# Patient Record
Sex: FEMALE | Race: WHITE | URBAN - METROPOLITAN AREA
[De-identification: names, ages, dates, MRNs, and addresses within clinical notes are randomized per-mention and may not be internally consistent; named-entity substitution may affect disease eponyms.]

---

## 2020-09-23 ENCOUNTER — CLAIMS CREATED FROM THE CLAIM WINDOW (OUTPATIENT)
Dept: URBAN - METROPOLITAN AREA CLINIC 4 | Facility: CLINIC | Age: 70
End: 2020-09-23
Payer: MEDICARE

## 2020-09-23 ENCOUNTER — OFFICE VISIT (OUTPATIENT)
Dept: URBAN - NONMETROPOLITAN AREA SURGERY CENTER 1 | Facility: SURGERY CENTER | Age: 70
End: 2020-09-23
Payer: MEDICARE

## 2020-09-23 DIAGNOSIS — Z86.010 H/O ADENOMATOUS POLYP OF COLON: ICD-10-CM

## 2020-09-23 DIAGNOSIS — D12.4 ADENOMA OF DESCENDING COLON: ICD-10-CM

## 2020-09-23 DIAGNOSIS — D12.4 BENIGN NEOPLASM OF DESCENDING COLON: ICD-10-CM

## 2020-09-23 PROCEDURE — G9936 PMH PLYP/NEO CO/RECT/JUN/ANS: HCPCS | Performed by: INTERNAL MEDICINE

## 2020-09-23 PROCEDURE — 88305 TISSUE EXAM BY PATHOLOGIST: CPT | Performed by: PATHOLOGY

## 2020-09-23 PROCEDURE — 45385 COLONOSCOPY W/LESION REMOVAL: CPT | Performed by: INTERNAL MEDICINE

## 2020-09-23 PROCEDURE — G8907 PT DOC NO EVENTS ON DISCHARG: HCPCS | Performed by: INTERNAL MEDICINE

## 2021-02-09 ENCOUNTER — APPOINTMENT (OUTPATIENT)
Dept: URBAN - NONMETROPOLITAN AREA CLINIC 45 | Age: 71
Setting detail: DERMATOLOGY
End: 2021-02-15

## 2021-02-09 DIAGNOSIS — L64.8 OTHER ANDROGENIC ALOPECIA: ICD-10-CM

## 2021-02-09 DIAGNOSIS — L82.1 OTHER SEBORRHEIC KERATOSIS: ICD-10-CM

## 2021-02-09 DIAGNOSIS — L21.8 OTHER SEBORRHEIC DERMATITIS: ICD-10-CM

## 2021-02-09 PROBLEM — D48.5 NEOPLASM OF UNCERTAIN BEHAVIOR OF SKIN: Status: ACTIVE | Noted: 2021-02-09

## 2021-02-09 PROCEDURE — 99203 OFFICE O/P NEW LOW 30 MIN: CPT | Mod: 25

## 2021-02-09 PROCEDURE — OTHER MIPS QUALITY: OTHER

## 2021-02-09 PROCEDURE — OTHER REASSURANCE: OTHER

## 2021-02-09 PROCEDURE — OTHER PRESCRIPTION: OTHER

## 2021-02-09 PROCEDURE — OTHER TREATMENT REGIMEN: OTHER

## 2021-02-09 PROCEDURE — OTHER BIOPSY BY SHAVE METHOD: OTHER

## 2021-02-09 PROCEDURE — 11102 TANGNTL BX SKIN SINGLE LES: CPT

## 2021-02-09 PROCEDURE — OTHER COUNSELING: OTHER

## 2021-02-09 RX ORDER — FLUTICASONE PROPIONATE 0.5 MG/G
APPLY CREAM TOPICAL
Qty: 1 | Refills: 1 | Status: ERX | COMMUNITY
Start: 2021-02-09

## 2021-02-09 ASSESSMENT — LOCATION DETAILED DESCRIPTION DERM
LOCATION DETAILED: LEFT POPLITEAL SKIN
LOCATION DETAILED: POSTERIOR MID-PARIETAL SCALP
LOCATION DETAILED: RIGHT SUPERIOR MEDIAL UPPER BACK
LOCATION DETAILED: RIGHT SUPERIOR UPPER BACK

## 2021-02-09 ASSESSMENT — LOCATION ZONE DERM
LOCATION ZONE: LEG
LOCATION ZONE: TRUNK
LOCATION ZONE: SCALP

## 2021-02-09 ASSESSMENT — LOCATION SIMPLE DESCRIPTION DERM
LOCATION SIMPLE: LEFT POPLITEAL SKIN
LOCATION SIMPLE: RIGHT UPPER BACK
LOCATION SIMPLE: POSTERIOR SCALP

## 2021-02-09 NOTE — HPI: OTHER
Condition:: Hair loss
Please Describe Your Condition:: is being seen for a chief complaint of Hair loss . Patient states her hair started coming out more than a year ago. Patient states no increased stress, patient has had surgery 2 months ago (right leg tibia/fibula fracture post fall) but states this is not what caused it because she had major hair loss before the surgery.  Patient has tried OTC Biotin and revive and cell shampoos with no help. \\nPatient had recent blood work with primary care doctor for thyroid and she states her thyroid is stable and has been on levothyroxine for 25 years. \\nPatient states she cannot figure out what started her hair loss but it was sudden.  Patient saw PA at Dr. Calzada’s office and felt dismissed, was given no treatment and no labs given.  Positive family history of hair loss mother and sister.
Condition:: History of skin cancer
Please Describe Your Condition:: is being seen for a chief complaint of History of skin cancer . Patient states she had a skin cancer removed on right forehead, unsure which type but was removed by a plastic surgeon about 8-10 years ago.  Patient has seen another dermatologist for skin cancer surveillance and had a full body about 4 months ago, declines full exam today.

## 2021-02-09 NOTE — PROCEDURE: TREATMENT REGIMEN
Plan: OTC Minoxidil 5% once daily \\nOTC Biotin 5mg once a day \\nBloodwork ordered today (CBC with diff, ferritin, Iron/TIBC-[place])\\nReviewed recent bloodwork (CMP, TSH, T4)\\nConsider dutasteride if worsens or persists
Detail Level: Simple
Plan: Fluticasone cream twice a day as needed for ear scaling

## 2021-02-09 NOTE — PROCEDURE: BIOPSY BY SHAVE METHOD
Detail Level: Detailed
Hide Anticipated Plan (Based On Presumed Biopsy Results)?: No
Anesthesia Type: 1% lidocaine with epinephrine and a 1:10 solution of 8.4% sodium bicarbonate
Body Location Override (Optional - Billing Will Still Be Based On Selected Body Map Location If Applicable): right medial shoulder
Consent: Written consent was obtained and risks were reviewed including but not limited to scarring, infection, bleeding, scabbing, incomplete removal, nerve damage and allergy to anesthesia.
Notification Instructions: Patient will be notified of biopsy results. However, patient instructed to call the office if not contacted within 2 weeks.
Anesthesia Volume In Cc (Will Not Render If 0): 1
Hemostasis: Aluminum Chloride
Curettage Text: The wound bed was treated with curettage after the biopsy was performed.
Information: Selecting Yes will display possible errors in your note based on the variables you have selected. This validation is only offered as a suggestion for you. PLEASE NOTE THAT THE VALIDATION TEXT WILL BE REMOVED WHEN YOU FINALIZE YOUR NOTE. IF YOU WANT TO FAX A PRELIMINARY NOTE YOU WILL NEED TO TOGGLE THIS TO 'NO' IF YOU DO NOT WANT IT IN YOUR FAXED NOTE.
Post-Care Instructions: I reviewed with the patient in detail post-care instructions. Patient is to keep the biopsy site dry overnight, and then apply bacitracin twice daily until healed. Patient may apply hydrogen peroxide soaks to remove any crusting.
Dressing: pressure dressing with telfa
Electrodesiccation And Curettage Text: The wound bed was treated with electrodesiccation and curettage after the biopsy was performed.
Biopsy Method: double edge Personna blade
Size Of Lesion In Cm: 0
Wound Care: Mupirocin
Cryotherapy Text: The wound bed was treated with cryotherapy after the biopsy was performed.
Billing Type: Third-Party Bill
Biopsy Type: H and E
Was A Bandage Applied: Yes
Silver Nitrate Text: The wound bed was treated with silver nitrate after the biopsy was performed.
Depth Of Biopsy: dermis
Type Of Destruction Used: Curettage
Electrodesiccation Text: The wound bed was treated with electrodesiccation after the biopsy was performed.

## 2022-09-12 ENCOUNTER — CLAIMS CREATED FROM THE CLAIM WINDOW (OUTPATIENT)
Dept: URBAN - NONMETROPOLITAN AREA CLINIC 2 | Facility: CLINIC | Age: 72
End: 2022-09-12
Payer: MEDICARE

## 2022-09-12 ENCOUNTER — LAB OUTSIDE AN ENCOUNTER (OUTPATIENT)
Dept: URBAN - NONMETROPOLITAN AREA CLINIC 2 | Facility: CLINIC | Age: 72
End: 2022-09-12

## 2022-09-12 ENCOUNTER — WEB ENCOUNTER (OUTPATIENT)
Dept: URBAN - NONMETROPOLITAN AREA CLINIC 2 | Facility: CLINIC | Age: 72
End: 2022-09-12

## 2022-09-12 ENCOUNTER — TELEPHONE ENCOUNTER (OUTPATIENT)
Dept: URBAN - NONMETROPOLITAN AREA CLINIC 2 | Facility: CLINIC | Age: 72
End: 2022-09-12

## 2022-09-12 VITALS
BODY MASS INDEX: 31.66 KG/M2 | DIASTOLIC BLOOD PRESSURE: 82 MMHG | HEIGHT: 66 IN | TEMPERATURE: 97.5 F | SYSTOLIC BLOOD PRESSURE: 141 MMHG | HEART RATE: 77 BPM | WEIGHT: 197 LBS

## 2022-09-12 DIAGNOSIS — K44.9 HIATAL HERNIA: ICD-10-CM

## 2022-09-12 DIAGNOSIS — Z72.89 ALCOHOL USE: ICD-10-CM

## 2022-09-12 DIAGNOSIS — D50.8 ACQUIRED IRON DEFICIENCY ANEMIA DUE TO DECREASED ABSORPTION: ICD-10-CM

## 2022-09-12 DIAGNOSIS — Z86.010 PERSONAL HISTORY OF COLONIC POLYPS: ICD-10-CM

## 2022-09-12 DIAGNOSIS — D50.0 IRON DEFICIENCY ANEMIA DUE TO CHRONIC BLOOD LOSS: ICD-10-CM

## 2022-09-12 PROCEDURE — 99214 OFFICE O/P EST MOD 30 MIN: CPT | Performed by: NURSE PRACTITIONER

## 2022-09-12 RX ORDER — FERROUS SULFATE 142(45)MG
1 TABLET TABLET, EXTENDED RELEASE ORAL ONCE A DAY
Status: ACTIVE | COMMUNITY

## 2022-09-12 RX ORDER — CITALOPRAM 40 MG/1
0.5 TABLET TABLET, FILM COATED ORAL ONCE A DAY
Status: ACTIVE | COMMUNITY

## 2022-09-12 RX ORDER — DENOSUMAB 60 MG/ML
AS DIRECTED INJECTION SUBCUTANEOUS
Status: ACTIVE | COMMUNITY

## 2022-09-12 RX ORDER — GABAPENTIN 300 MG/1
1 CAPSULE CAPSULE ORAL ONCE A DAY
Status: ACTIVE | COMMUNITY

## 2022-09-12 RX ORDER — MIRABEGRON 50 MG/1
1 TABLET TABLET, FILM COATED, EXTENDED RELEASE ORAL ONCE A DAY
Status: ACTIVE | COMMUNITY

## 2022-09-12 RX ORDER — PRAVASTATIN SODIUM 40 MG/1
1 TABLET TABLET ORAL ONCE A DAY
Status: ACTIVE | COMMUNITY

## 2022-09-12 NOTE — HPI-TODAY'S VISIT:
On/12/2022 Edith presents for evaluation of iron deficiency anemia.  She was recently found to have a hemoglobin of 8.7 on routine lab work.  She denies any GI bleeding.  Her last colonoscopy was done by Dr. Quick with 1 benign polyp in September 2020.  She does have a history of reflux and dyspepsia.  She is on Nexium for years and is recently been taking Pepcid due to her osteopenia and osteoporosis.  She states that she was told a long time ago that she had a large hiatal hernia.  She is never had an upper endoscopy.  She has been on Slow Fe since her labs done in August.  Today we have discussed the work-up for iron deficiency anemia.  She would like to defer repeat colonoscopy for now, she agrees for an upper endoscopy to evaluate for peptic ulcer disease.  She does take Aleve 1 or 2 times a month.  She also states that she typically drinks 3 cocktails every night.  She has not had any labs done of her liver function, she has had no imaging of her liver.  Today she is doing well otherwise with no new GI complaints.  MB

## 2022-09-13 LAB
ALBUMIN/GLOBULIN RATIO: 1.6
ALBUMIN: 4.5
ALKALINE PHOSPHATASE: 76
ALT (SGPT): 16
AST (SGOT): 17
BILIRUBIN, DIRECT: 0.1
BILIRUBIN, INDIRECT: 0.3
BILIRUBIN, TOTAL: 0.4
GLOBULIN: 2.8
PROTEIN, TOTAL: 7.3

## 2022-09-30 ENCOUNTER — TELEPHONE ENCOUNTER (OUTPATIENT)
Dept: URBAN - METROPOLITAN AREA CLINIC 92 | Facility: CLINIC | Age: 72
End: 2022-09-30

## 2022-10-06 ENCOUNTER — OFFICE VISIT (OUTPATIENT)
Dept: URBAN - METROPOLITAN AREA MEDICAL CENTER 1 | Facility: MEDICAL CENTER | Age: 72
End: 2022-10-06

## 2022-10-06 RX ORDER — CITALOPRAM 40 MG/1
0.5 TABLET TABLET, FILM COATED ORAL ONCE A DAY
Status: ACTIVE | COMMUNITY

## 2022-10-06 RX ORDER — DENOSUMAB 60 MG/ML
AS DIRECTED INJECTION SUBCUTANEOUS
Status: ACTIVE | COMMUNITY

## 2022-10-06 RX ORDER — GABAPENTIN 300 MG/1
1 CAPSULE CAPSULE ORAL ONCE A DAY
Status: ACTIVE | COMMUNITY

## 2022-10-06 RX ORDER — MIRABEGRON 50 MG/1
1 TABLET TABLET, FILM COATED, EXTENDED RELEASE ORAL ONCE A DAY
Status: ACTIVE | COMMUNITY

## 2022-10-06 RX ORDER — PRAVASTATIN SODIUM 40 MG/1
1 TABLET TABLET ORAL ONCE A DAY
Status: ACTIVE | COMMUNITY

## 2022-10-06 RX ORDER — FERROUS SULFATE 142(45)MG
1 TABLET TABLET, EXTENDED RELEASE ORAL ONCE A DAY
Status: ACTIVE | COMMUNITY

## 2022-11-03 ENCOUNTER — LAB OUTSIDE AN ENCOUNTER (OUTPATIENT)
Dept: URBAN - NONMETROPOLITAN AREA CLINIC 2 | Facility: CLINIC | Age: 72
End: 2022-11-03

## 2022-11-03 ENCOUNTER — OFFICE VISIT (OUTPATIENT)
Dept: URBAN - METROPOLITAN AREA MEDICAL CENTER 1 | Facility: MEDICAL CENTER | Age: 72
End: 2022-11-03
Payer: MEDICARE

## 2022-11-03 DIAGNOSIS — D50.9 ANEMIA: ICD-10-CM

## 2022-11-03 DIAGNOSIS — K31.89 ACQUIRED DEFORMITY OF DUODENUM: ICD-10-CM

## 2022-11-03 DIAGNOSIS — K22.89 DILATATION OF ESOPHAGUS: ICD-10-CM

## 2022-11-03 PROCEDURE — 43239 EGD BIOPSY SINGLE/MULTIPLE: CPT | Performed by: INTERNAL MEDICINE

## 2022-11-03 RX ORDER — CITALOPRAM 40 MG/1
0.5 TABLET TABLET, FILM COATED ORAL ONCE A DAY
Status: ACTIVE | COMMUNITY

## 2022-11-03 RX ORDER — GABAPENTIN 300 MG/1
1 CAPSULE CAPSULE ORAL ONCE A DAY
Status: ACTIVE | COMMUNITY

## 2022-11-03 RX ORDER — PRAVASTATIN SODIUM 40 MG/1
1 TABLET TABLET ORAL ONCE A DAY
Status: ACTIVE | COMMUNITY

## 2022-11-03 RX ORDER — DENOSUMAB 60 MG/ML
AS DIRECTED INJECTION SUBCUTANEOUS
Status: ACTIVE | COMMUNITY

## 2022-11-03 RX ORDER — FERROUS SULFATE 142(45)MG
1 TABLET TABLET, EXTENDED RELEASE ORAL ONCE A DAY
Status: ACTIVE | COMMUNITY

## 2022-11-03 RX ORDER — MIRABEGRON 50 MG/1
1 TABLET TABLET, FILM COATED, EXTENDED RELEASE ORAL ONCE A DAY
Status: ACTIVE | COMMUNITY

## 2022-11-04 LAB
AP CASE REPORT: (no result)
AP FINAL DIAGNOSIS: (no result)
AP GROSS DESCRIPTION: (no result)
AP MICROSCOPIC DESCRIPTION: (no result)
AP SPECIAL STAINS: (no result)

## 2023-02-03 ENCOUNTER — WEB ENCOUNTER (OUTPATIENT)
Dept: URBAN - NONMETROPOLITAN AREA CLINIC 2 | Facility: CLINIC | Age: 73
End: 2023-02-03

## 2023-02-03 ENCOUNTER — DASHBOARD ENCOUNTERS (OUTPATIENT)
Age: 73
End: 2023-02-03

## 2023-02-03 ENCOUNTER — LAB OUTSIDE AN ENCOUNTER (OUTPATIENT)
Dept: URBAN - NONMETROPOLITAN AREA CLINIC 2 | Facility: CLINIC | Age: 73
End: 2023-02-03

## 2023-02-03 ENCOUNTER — OFFICE VISIT (OUTPATIENT)
Dept: URBAN - NONMETROPOLITAN AREA CLINIC 2 | Facility: CLINIC | Age: 73
End: 2023-02-03
Payer: MEDICARE

## 2023-02-03 VITALS
HEART RATE: 102 BPM | HEIGHT: 66 IN | TEMPERATURE: 98.6 F | DIASTOLIC BLOOD PRESSURE: 71 MMHG | SYSTOLIC BLOOD PRESSURE: 103 MMHG | BODY MASS INDEX: 29.73 KG/M2 | WEIGHT: 185 LBS

## 2023-02-03 DIAGNOSIS — K76.0 FATTY LIVER: ICD-10-CM

## 2023-02-03 DIAGNOSIS — K44.9 HIATAL HERNIA: ICD-10-CM

## 2023-02-03 DIAGNOSIS — D50.8 ACQUIRED IRON DEFICIENCY ANEMIA DUE TO DECREASED ABSORPTION: ICD-10-CM

## 2023-02-03 PROBLEM — 197321007: Status: ACTIVE | Noted: 2023-02-03

## 2023-02-03 PROBLEM — 724556004: Status: ACTIVE | Noted: 2022-09-12

## 2023-02-03 PROBLEM — 428283002: Status: ACTIVE | Noted: 2022-09-12

## 2023-02-03 PROBLEM — 219006: Status: ACTIVE | Noted: 2022-09-12

## 2023-02-03 PROBLEM — 84089009: Status: ACTIVE | Noted: 2022-09-12

## 2023-02-03 PROCEDURE — 99214 OFFICE O/P EST MOD 30 MIN: CPT | Performed by: NURSE PRACTITIONER

## 2023-02-03 RX ORDER — CITALOPRAM 40 MG/1
0.5 TABLET TABLET, FILM COATED ORAL ONCE A DAY
Status: ACTIVE | COMMUNITY

## 2023-02-03 RX ORDER — GABAPENTIN 300 MG/1
1 CAPSULE CAPSULE ORAL ONCE A DAY
Status: ACTIVE | COMMUNITY

## 2023-02-03 RX ORDER — PRAVASTATIN SODIUM 40 MG/1
1 TABLET TABLET ORAL ONCE A DAY
Status: ACTIVE | COMMUNITY

## 2023-02-03 RX ORDER — FERROUS SULFATE 142(45)MG
1 TABLET TABLET, EXTENDED RELEASE ORAL ONCE A DAY
Status: ACTIVE | COMMUNITY

## 2023-02-03 RX ORDER — MIRABEGRON 50 MG/1
1 TABLET TABLET, FILM COATED, EXTENDED RELEASE ORAL ONCE A DAY
Status: ACTIVE | COMMUNITY

## 2023-02-03 RX ORDER — DENOSUMAB 60 MG/ML
AS DIRECTED INJECTION SUBCUTANEOUS
Status: ACTIVE | COMMUNITY

## 2023-02-03 NOTE — HPI-TODAY'S VISIT:
On/12/2022 Edith presents for evaluation of iron deficiency anemia.  She was recently found to have a hemoglobin of 8.7 on routine lab work.  She denies any GI bleeding.  Her last colonoscopy was done by Dr. Quick with 1 benign polyp in September 2020.  She does have a history of reflux and dyspepsia.  She is on Nexium for years and is recently been taking Pepcid due to her osteopenia and osteoporosis.  She states that she was told a long time ago that she had a large hiatal hernia.  She is never had an upper endoscopy.  She has been on Slow Fe since her labs done in August.  Today we have discussed the work-up for iron deficiency anemia.  She would like to defer repeat colonoscopy for now, she agrees for an upper endoscopy to evaluate for peptic ulcer disease.  She does take Aleve 1 or 2 times a month.  She also states that she typically drinks 3 cocktails every night.  She has not had any labs done of her liver function, she has had no imaging of her liver.  Today she is doing well otherwise with no new GI complaints.  MB 2/3/2023 Edith presents for follow-up of iron deficiency anemia.  Her EGD reveals a 4 cm hiatal hernia and normal gastric and small bowel biopsies.  She is on Nexium 20 mg daily with no reflux.  Her bowels are moving daily with no rectal bleeding or melena.  Her ultrasound does show significant steatosis, her PET function panel is normal.  She continues to drink 2-3 alcoholic drinks on a daily basis.  She denies any GI bleeding symptoms.  She declines colonoscopy.  Her hemoglobin was 8.4 last fall, she is on Slow Fe daily.  She does agree to repeat lab work and a CT scan of her small bowel.  If this is normal and she remains anemic she declines repeat colonoscopy or video capsule endoscopy.  Today she denies any new GI complaints.  MB

## 2023-02-04 LAB
A/G RATIO: 2
ABSOLUTE BASOPHILS: 31
ABSOLUTE EOSINOPHILS: 122
ABSOLUTE LYMPHOCYTES: 1698
ABSOLUTE MONOCYTES: 632
ABSOLUTE NEUTROPHILS: 2616
ALBUMIN: 4.6
ALKALINE PHOSPHATASE: 61
ALT (SGPT): 24
AST (SGOT): 21
BASOPHILS: 0.6
BILIRUBIN, TOTAL: 0.7
BUN/CREATININE RATIO: (no result)
BUN: 13
CALCIUM: 9.5
CARBON DIOXIDE, TOTAL: 25
CHLORIDE: 104
CREATININE: 0.78
EGFR: 81
EOSINOPHILS: 2.4
FERRITIN, SERUM: 38
GLOBULIN, TOTAL: 2.3
GLUCOSE: 88
HEMATOCRIT: 38.2
HEMOGLOBIN: 12.9
IRON BIND.CAP.(TIBC): 418
IRON SATURATION: 15
IRON: 61
LYMPHOCYTES: 33.3
MCH: 32.7
MCHC: 33.8
MCV: 96.7
MONOCYTES: 12.4
MPV: 10.3
NEUTROPHILS: 51.3
PLATELET COUNT: 339
POTASSIUM: 4.9
PROTEIN, TOTAL: 6.9
RDW: 13
RED BLOOD CELL COUNT: 3.95
SODIUM: 139
WHITE BLOOD CELL COUNT: 5.1

## 2023-02-06 ENCOUNTER — TELEPHONE ENCOUNTER (OUTPATIENT)
Dept: URBAN - METROPOLITAN AREA CLINIC 92 | Facility: CLINIC | Age: 73
End: 2023-02-06

## 2023-02-13 ENCOUNTER — TELEPHONE ENCOUNTER (OUTPATIENT)
Dept: URBAN - NONMETROPOLITAN AREA CLINIC 2 | Facility: CLINIC | Age: 73
End: 2023-02-13

## 2023-03-16 ENCOUNTER — TELEPHONE ENCOUNTER (OUTPATIENT)
Dept: URBAN - METROPOLITAN AREA CLINIC 35 | Facility: CLINIC | Age: 73
End: 2023-03-16

## 2023-03-16 PROBLEM — 235919008: Status: ACTIVE | Noted: 2023-03-16

## 2023-08-04 ENCOUNTER — OFFICE VISIT (OUTPATIENT)
Dept: URBAN - NONMETROPOLITAN AREA CLINIC 2 | Facility: CLINIC | Age: 73
End: 2023-08-04